# Patient Record
Sex: MALE
[De-identification: names, ages, dates, MRNs, and addresses within clinical notes are randomized per-mention and may not be internally consistent; named-entity substitution may affect disease eponyms.]

---

## 2024-09-11 PROBLEM — Z00.00 ENCOUNTER FOR PREVENTIVE HEALTH EXAMINATION: Status: ACTIVE | Noted: 2024-09-11

## 2024-09-24 ENCOUNTER — APPOINTMENT (OUTPATIENT)
Dept: OTOLARYNGOLOGY | Facility: CLINIC | Age: 49
End: 2024-09-24

## 2024-09-24 NOTE — HISTORY OF PRESENT ILLNESS
[de-identified] : This is a patient referred by Wayne Rodarte for right thyroid nodule.  FNA of right thyroid 4/2024 follicular lesion undetermined raghav III and thyroseq +  This was found          months ago during physical exam/US. No dysphagia, dysphonia or dyspnea. Patient denies h/o radiation and has no family h/o thyroid cancer.

## 2024-10-29 ENCOUNTER — APPOINTMENT (OUTPATIENT)
Dept: OTOLARYNGOLOGY | Facility: CLINIC | Age: 49
End: 2024-10-29